# Patient Record
Sex: MALE | Race: WHITE
[De-identification: names, ages, dates, MRNs, and addresses within clinical notes are randomized per-mention and may not be internally consistent; named-entity substitution may affect disease eponyms.]

---

## 2018-04-09 ENCOUNTER — HOSPITAL ENCOUNTER (OUTPATIENT)
Dept: HOSPITAL 89 - CT | Age: 18
End: 2018-04-09
Attending: NURSE PRACTITIONER
Payer: COMMERCIAL

## 2018-04-09 ENCOUNTER — HOSPITAL ENCOUNTER (OUTPATIENT)
Dept: HOSPITAL 89 - ZZGRANDUC | Age: 18
End: 2018-04-09
Attending: NURSE PRACTITIONER
Payer: COMMERCIAL

## 2018-04-09 DIAGNOSIS — N20.1: ICD-10-CM

## 2018-04-09 DIAGNOSIS — M47.896: ICD-10-CM

## 2018-04-09 DIAGNOSIS — R10.9: Primary | ICD-10-CM

## 2018-04-09 DIAGNOSIS — N13.30: Primary | ICD-10-CM

## 2018-04-09 DIAGNOSIS — K42.9: ICD-10-CM

## 2018-04-09 LAB — PLATELET COUNT, AUTOMATED: 364 K/UL (ref 150–450)

## 2018-04-09 PROCEDURE — 84132 ASSAY OF SERUM POTASSIUM: CPT

## 2018-04-09 PROCEDURE — 82310 ASSAY OF CALCIUM: CPT

## 2018-04-09 PROCEDURE — 84520 ASSAY OF UREA NITROGEN: CPT

## 2018-04-09 PROCEDURE — 82040 ASSAY OF SERUM ALBUMIN: CPT

## 2018-04-09 PROCEDURE — 82247 BILIRUBIN TOTAL: CPT

## 2018-04-09 PROCEDURE — 84075 ASSAY ALKALINE PHOSPHATASE: CPT

## 2018-04-09 PROCEDURE — 82150 ASSAY OF AMYLASE: CPT

## 2018-04-09 PROCEDURE — 82947 ASSAY GLUCOSE BLOOD QUANT: CPT

## 2018-04-09 PROCEDURE — 84295 ASSAY OF SERUM SODIUM: CPT

## 2018-04-09 PROCEDURE — 83690 ASSAY OF LIPASE: CPT

## 2018-04-09 PROCEDURE — 84450 TRANSFERASE (AST) (SGOT): CPT

## 2018-04-09 PROCEDURE — 74176 CT ABD & PELVIS W/O CONTRAST: CPT

## 2018-04-09 PROCEDURE — 82435 ASSAY OF BLOOD CHLORIDE: CPT

## 2018-04-09 PROCEDURE — 82374 ASSAY BLOOD CARBON DIOXIDE: CPT

## 2018-04-09 PROCEDURE — 84460 ALANINE AMINO (ALT) (SGPT): CPT

## 2018-04-09 PROCEDURE — 85025 COMPLETE CBC W/AUTO DIFF WBC: CPT

## 2018-04-09 PROCEDURE — 84155 ASSAY OF PROTEIN SERUM: CPT

## 2018-04-09 PROCEDURE — 82565 ASSAY OF CREATININE: CPT

## 2018-04-09 NOTE — RADIOLOGY IMAGING REPORT
FACILITY: [facility] 

 

PATIENT NAME: Valentino Saba

: 2000

MR: [id number]

V: [id number]

EXAM DATE: 

ORDERING PHYSICIAN: DENISE PALOMO

TECHNOLOGIST: 

 

Location: [facility] [place]

Patient: Valentino Saba

: 2000

MRN: [id number]

Visit/Account:[id number]

Date of Sevice:  2018

 

ACCESSION #: [id number]

 

EXAMINATION:   Abdomen and pelvis CT without contrast

 

HISTORY:   Right flank pain, constipation

 

TECHNIQUE:   CT was performed through the abdomen and pelvis without iv contrast.  Sagittal and coron
al reformatted images were generated.

 

One of the following dose optimization techniques was utilized in the performance of this exam: autom
ated exposure control; adjustment of the mA and/or kV according to patient size; or use of iterative 
reconstruction technique.  Specific details can be referenced in the facility's radiology CT exam ope
rational policy.

 

COMPARISON:   None

 

FINDINGS:

 

Lower chest: Normal.

 

Spleen: Normal.

Adrenal glands: Normal.

Pancreas: Normal.

Kidneys: Mild to moderate right hydronephrosis. Mild right hydroureter. Calculus within the distal ri
ght ureter near the ureteropelvic vesicle junction measures 3.5 mm craniocaudad by 4 mm AP.

Liver / biliary: Normal gallbladder, normal liver.

 

Vessels: Normal.

 

Lymph node assessment: Normal.

 

Bowel including small bowel, colon and appendix: Normal stomach, normal small bowel. Appendix not vis
ualized. Moderate volume right hemicolonic stool.

 

Peritoneum / retroperitoneum / mesentery: Normal.

 

Pelvic  structures: Normal bladder, normal prostate and normal rectum. No pelvic fluid. Mild fat st
randing surrounds the distal right ureter. No pelvic fluid or adenopathy.

 

Body wall: Tiny fat-containing umbilical hernia.

 

Musculoskeletal: Chronic left L5 pars defect.

 

IMPRESSION:

 

1. 4 mm calculus within the distal right ureter near the ureterovesical junction with resultant mild 
right hydroureter and mild to moderate right hydronephrosis.

2. Chronic left L5 pars defect.

 

Report Dictated By: Cecilio Delgado MD at 2018 12:28 PM

 

Report E-Signed By: Cecilio Delgado MD  at 2018 12:35 PM

 

WSN:[id number]

## 2018-04-17 ENCOUNTER — HOSPITAL ENCOUNTER (INPATIENT)
Dept: HOSPITAL 89 - ER | Age: 18
LOS: 2 days | Discharge: HOME | DRG: 694 | End: 2018-04-19
Attending: UROLOGY | Admitting: UROLOGY
Payer: COMMERCIAL

## 2018-04-17 VITALS
BODY MASS INDEX: 29.86 KG/M2 | WEIGHT: 197 LBS | WEIGHT: 197 LBS | HEIGHT: 68 IN | HEIGHT: 68 IN | BODY MASS INDEX: 29.86 KG/M2

## 2018-04-17 VITALS — DIASTOLIC BLOOD PRESSURE: 80 MMHG | SYSTOLIC BLOOD PRESSURE: 121 MMHG

## 2018-04-17 DIAGNOSIS — N20.1: Primary | ICD-10-CM

## 2018-04-17 LAB — PLATELET COUNT, AUTOMATED: 362 K/UL (ref 150–450)

## 2018-04-17 PROCEDURE — 82435 ASSAY OF BLOOD CHLORIDE: CPT

## 2018-04-17 PROCEDURE — 99285 EMERGENCY DEPT VISIT HI MDM: CPT

## 2018-04-17 PROCEDURE — 84132 ASSAY OF SERUM POTASSIUM: CPT

## 2018-04-17 PROCEDURE — 82947 ASSAY GLUCOSE BLOOD QUANT: CPT

## 2018-04-17 PROCEDURE — 82310 ASSAY OF CALCIUM: CPT

## 2018-04-17 PROCEDURE — 87088 URINE BACTERIA CULTURE: CPT

## 2018-04-17 PROCEDURE — 74176 CT ABD & PELVIS W/O CONTRAST: CPT

## 2018-04-17 PROCEDURE — 83690 ASSAY OF LIPASE: CPT

## 2018-04-17 PROCEDURE — 84460 ALANINE AMINO (ALT) (SGPT): CPT

## 2018-04-17 PROCEDURE — 85025 COMPLETE CBC W/AUTO DIFF WBC: CPT

## 2018-04-17 PROCEDURE — 82565 ASSAY OF CREATININE: CPT

## 2018-04-17 PROCEDURE — 74420 UROGRAPHY RTRGR +-KUB: CPT

## 2018-04-17 PROCEDURE — 84295 ASSAY OF SERUM SODIUM: CPT

## 2018-04-17 PROCEDURE — 81001 URINALYSIS AUTO W/SCOPE: CPT

## 2018-04-17 PROCEDURE — 84155 ASSAY OF PROTEIN SERUM: CPT

## 2018-04-17 PROCEDURE — 82040 ASSAY OF SERUM ALBUMIN: CPT

## 2018-04-17 PROCEDURE — 82150 ASSAY OF AMYLASE: CPT

## 2018-04-17 PROCEDURE — 84075 ASSAY ALKALINE PHOSPHATASE: CPT

## 2018-04-17 PROCEDURE — 82374 ASSAY BLOOD CARBON DIOXIDE: CPT

## 2018-04-17 PROCEDURE — 84450 TRANSFERASE (AST) (SGOT): CPT

## 2018-04-17 PROCEDURE — 84520 ASSAY OF UREA NITROGEN: CPT

## 2018-04-17 PROCEDURE — 82247 BILIRUBIN TOTAL: CPT

## 2018-04-17 RX ADMIN — LIDOCAINE HYDROCHLORIDE SCH MLS/HR: 10 INJECTION, SOLUTION INFILTRATION; PERINEURAL at 22:31

## 2018-04-17 RX ADMIN — SODIUM CHLORIDE, SODIUM LACTATE, POTASSIUM CHLORIDE, AND CALCIUM CHLORIDE PRN MLS/HR: 600; 310; 30; 20 INJECTION, SOLUTION INTRAVENOUS at 22:31
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
## 2018-04-17 NOTE — RADIOLOGY IMAGING REPORT
FACILITY: [facility] 

 

PATIENT NAME: Valentino Saba

: 2000

MR: [id number]

V: [id number]

EXAM DATE: [id number]

ORDERING PHYSICIAN: WALTER CORDERO

TECHNOLOGIST: 

 

Location: [facility] - [place]

Patient: Valentino Saba

: 2000

MRN: [id number]

Visit/Account:[id number]

Date of Sevice:  2018

 

ACCESSION #: [id number]

 

CT abdomen and pelvis without contrast

 

Indication: Flank pain for one week.

 

Comparison: 2018.

 

Technique: Axial CT images are obtained through the abdomen and pelvis. Reformatted coronal and sagit
hernando images were reviewed. IV contrast was not administered.

 One of the following dose optimization techniques was utilized in the performance of this exam: auto
mated exposure control; adjustment of the mA and/or kV according to the patient's size; or use of an 
iterative reconstruction technique.  Specific details can be referenced in the facility's radiology C
T exam operational policy.

 

Findings:

Lower lung fields: Limited views lower lung field are unremarkable.

 

Evaluation of the solid organs of the abdomen is limited without IV contrast.

 

Liver: No focal parenchymal abnormality of the liver.

Biliary: Gallbladder appears unremarkable as well as the intra and extra hepatic biliary system.

Pancreas: Normal appearance.

Spleen: Normal appearance.

Adrenal glands: Unremarkable.

Kidneys / retroperitoneum: Mild right hydroureter and hydronephrosis secondary to a 5.6 mm stone at t
he UVJ. Similar to the previous exam although the stone appears slightly more distal. No other right 
urinary stones. Left kidney shows no stones or hydronephrosis. No discrete renal lesions.

 

 

Bowel / peritoneum / mesenteries: The visualized gastrointestinal tract is within normal limits. The 
appendix not definitely visualized. The stomach is distended with debris.

No free air, free fluid, fluid collections or areas of inflammation. Small umbilical hernia containin
g fat.

 

Lymph node assessment: No pathologic adenopathy identified.

 

Pelvic  structures: Appear unremarkable.

 

 

Vessels: No significant atherosclerotic calcifications seen throughout a nonaneurysmal abdominal aort
a and branches.

 

Musculoskeletal / Body wall: No acute or aggressive osseous abnormality. There is again a left-sided 
pars defect L5 level without spondylolisthesis.

 

 

 

IMPRESSION:

1. Continued right hydronephrosis due to a UVJ stone measuring 5.6 mm. The stone is similar to the pr
evious exam but may be slightly more distal.

2. Other stable chronic findings as above.

 

 

Report Dictated By: Harris Saavedra at 2018 8:11 PM

 

Report E-Signed By: Harris Saavedra  at 2018 8:17 PM

 

WSN:[id number]

## 2018-04-18 VITALS — SYSTOLIC BLOOD PRESSURE: 112 MMHG | DIASTOLIC BLOOD PRESSURE: 66 MMHG

## 2018-04-18 VITALS — SYSTOLIC BLOOD PRESSURE: 116 MMHG | DIASTOLIC BLOOD PRESSURE: 79 MMHG

## 2018-04-18 VITALS — SYSTOLIC BLOOD PRESSURE: 129 MMHG | DIASTOLIC BLOOD PRESSURE: 86 MMHG

## 2018-04-18 VITALS — SYSTOLIC BLOOD PRESSURE: 109 MMHG | DIASTOLIC BLOOD PRESSURE: 59 MMHG

## 2018-04-18 VITALS — DIASTOLIC BLOOD PRESSURE: 68 MMHG | SYSTOLIC BLOOD PRESSURE: 112 MMHG

## 2018-04-18 VITALS — SYSTOLIC BLOOD PRESSURE: 119 MMHG | DIASTOLIC BLOOD PRESSURE: 77 MMHG

## 2018-04-18 VITALS — SYSTOLIC BLOOD PRESSURE: 116 MMHG | DIASTOLIC BLOOD PRESSURE: 70 MMHG

## 2018-04-18 VITALS — DIASTOLIC BLOOD PRESSURE: 79 MMHG | SYSTOLIC BLOOD PRESSURE: 130 MMHG

## 2018-04-18 VITALS — SYSTOLIC BLOOD PRESSURE: 121 MMHG | DIASTOLIC BLOOD PRESSURE: 66 MMHG

## 2018-04-18 VITALS — DIASTOLIC BLOOD PRESSURE: 61 MMHG | SYSTOLIC BLOOD PRESSURE: 116 MMHG

## 2018-04-18 VITALS — SYSTOLIC BLOOD PRESSURE: 133 MMHG | DIASTOLIC BLOOD PRESSURE: 104 MMHG

## 2018-04-18 VITALS — SYSTOLIC BLOOD PRESSURE: 120 MMHG | DIASTOLIC BLOOD PRESSURE: 65 MMHG

## 2018-04-18 VITALS — SYSTOLIC BLOOD PRESSURE: 122 MMHG | DIASTOLIC BLOOD PRESSURE: 76 MMHG

## 2018-04-18 VITALS — SYSTOLIC BLOOD PRESSURE: 121 MMHG | DIASTOLIC BLOOD PRESSURE: 91 MMHG

## 2018-04-18 PROCEDURE — BT1DZZZ FLUOROSCOPY OF RIGHT KIDNEY, URETER AND BLADDER: ICD-10-PCS | Performed by: UROLOGY

## 2018-04-18 PROCEDURE — 0T768DZ DILATION OF RIGHT URETER WITH INTRALUMINAL DEVICE, VIA NATURAL OR ARTIFICIAL OPENING ENDOSCOPIC: ICD-10-PCS | Performed by: UROLOGY

## 2018-04-18 RX ADMIN — SODIUM CHLORIDE, SODIUM LACTATE, POTASSIUM CHLORIDE, AND CALCIUM CHLORIDE PRN MLS/HR: 600; 310; 30; 20 INJECTION, SOLUTION INTRAVENOUS at 06:04

## 2018-04-18 RX ADMIN — GENTAMICIN SULFATE SCH MLS/HR: 80 INJECTION, SOLUTION INTRAVENOUS at 22:07

## 2018-04-18 RX ADMIN — FAMOTIDINE SCH MG: 20 TABLET, FILM COATED ORAL at 21:17

## 2018-04-18 RX ADMIN — DOCUSATE SODIUM SCH MG: 100 CAPSULE, LIQUID FILLED ORAL at 21:18

## 2018-04-18 RX ADMIN — LIDOCAINE HYDROCHLORIDE SCH MLS/HR: 10 INJECTION, SOLUTION INFILTRATION; PERINEURAL at 21:17

## 2018-04-18 RX ADMIN — HYDROCORTISONE SCH GM: 1 OINTMENT TOPICAL at 21:28

## 2018-04-18 NOTE — RADIOLOGY IMAGING REPORT
FACILITY: [facility] [place] 

 

PATIENT NAME: Valentino Saba

: 2000

MR: [id number]

V: [id number]

EXAM DATE: [id number]

ORDERING PHYSICIAN: YULI ACUÑA

TECHNOLOGIST: 

 

Location: [facility] [place]

Patient: Valentino Saba

: 2000

MRN: [id number]

Visit/Account:[id number]

Date of Sevice:  2018

 

ACCESSION #: [id number]

 

Exam type: RETROGRADE PYELOGRAM

 

History: HEMATURIA

 

Comparison: CT 2018.

 

Findings:

 

Nine intraoperative spot films of abdomen pelvis were submitted.  The total fluoroscopy time was eigh
t seconds.  The total fluoroscopy dose was 4.54 mGray.  Images demonstrate a small amount of contrast
 in the right renal collecting system which is incompletely imaged and the right ureter.  There is al
so placement of a right ureteral stent.  A cystoscope projects over the lower pelvis

 

IMPRESSION:

 

1.  As above

 

Report Dictated By: Edda Kaufman MD at 2018 5:23 PM

 

Report E-Signed By: Edda Kaufman MD  at 2018 5:26 PM

 

WSN:[id number]

## 2018-04-18 NOTE — OPERATIVE REPORT 1
EVENT DATE: April 18, 2018

SURGEON:  Kameron Frye MD

ANESTHESIOLOGIST: Tuan Dhillon MD

ANESTHESIA: General



PREOPERATIVE DIAGNOSIS  

Right ureterolithiasis with associated colic, nausea and vomiting.  



POSTOPERATIVE DIAGNOSIS 

Right ureterolithiasis with associated colic, nausea and vomiting.  



PROCEDURE PERFORMED 

1.  Cystourethroscopy.

2.  Right ureteropyelogram.

3.  Right ureteral stent placement.  



DESCRIPTION OF PROCEDURE

Under general anesthetic, the patient was prepped and draped in the extended 
lithotomy position.  



The 21 panendoscope admitted through the urethra into the bladder.  



The urethra was normal.  



The prostate showed trilobar hyperplasia, moderate median lobe.



Bladder showed 2-3+ trabeculation.  



Trigone and ureteral orifices were normal, except on the right, where there was 
definite swelling and inflammation with probable stone just a few millimeters 
proximal to the right ureteral orifice. 



The right ureteral orifice was pinpoint in nature. 



Two pictures were obtained of the right ureteral orifice area.  



The ureteral pyelogram was obtained.



The stone was in the right distal ureter.  



The access catheter was placed and then the guide wire.  



The 6 Slov[place] x 26 cm Percuflex Plus was passed satisfactorily up the right 
collecting system without any difficulty.  



There was a grossly hydronephrotic drip following the stent placement with cecilia
, cloudy type urine emitting from the right ureteral orifice.  



Urine was obtained for urinalysis, culture and sensitivity at the beginning of 
the procedure.  



X-rays confirmed satisfactory positioning. 



Of note, the right distal ureteral orifice was almost completely to the midline 
at the immediate bladder neck area.  



In spite of the anatomy, the procedure was performed satisfactorily in a 
relatively atraumatic fashion in my opinion.  



Patient returned to the recovery room in satisfactory condition.



INDICATION FOR PROCEDURE

This is a 18-year-old white male complaining of right ureterorenal colic with 
associated nausea and vomiting when admitted through the emergency room this 
past evening.  



Historically the patient by history had a stone diagnosed on April 9, 2018.



By history, the patient thinks he has had the problem for approximately two 
months with intermittent right ureterorenal colic episodes.  



This was confirmed, he had a 5.6 mm and/or larger stone at the right distal 
ureter.  



Preoperatively the options were discussed with the patient.  He was agreeable 
to further evaluation and therapy.  



See operative note for details.  



DISCHARGE INSTRUCTIONS

Patient will be discharged home on Keflex, Pepcid, Motrin, Pyridium and Norco 
therapy.  Plan followup probably this coming Monday for ureteroscopy and 
treatment of the right distal ureteral stones, probable laser lithotripsy and 
extraction.  

MTDD

## 2018-04-19 VITALS — DIASTOLIC BLOOD PRESSURE: 62 MMHG | SYSTOLIC BLOOD PRESSURE: 113 MMHG

## 2018-04-19 VITALS — SYSTOLIC BLOOD PRESSURE: 116 MMHG | DIASTOLIC BLOOD PRESSURE: 66 MMHG

## 2018-04-19 VITALS — DIASTOLIC BLOOD PRESSURE: 50 MMHG | SYSTOLIC BLOOD PRESSURE: 123 MMHG

## 2018-04-19 RX ADMIN — HYDROCORTISONE SCH GM: 1 OINTMENT TOPICAL at 08:27

## 2018-04-19 RX ADMIN — DOCUSATE SODIUM SCH MG: 100 CAPSULE, LIQUID FILLED ORAL at 08:27

## 2018-04-19 RX ADMIN — FAMOTIDINE SCH MG: 20 TABLET, FILM COATED ORAL at 08:27

## 2018-04-19 RX ADMIN — GENTAMICIN SULFATE SCH MLS/HR: 80 INJECTION, SOLUTION INTRAVENOUS at 06:01

## 2018-04-23 ENCOUNTER — HOSPITAL ENCOUNTER (OUTPATIENT)
Dept: HOSPITAL 89 - OR | Age: 18
Discharge: HOME | End: 2018-04-23
Attending: UROLOGY
Payer: COMMERCIAL

## 2018-04-23 VITALS — SYSTOLIC BLOOD PRESSURE: 129 MMHG | DIASTOLIC BLOOD PRESSURE: 72 MMHG

## 2018-04-23 VITALS — SYSTOLIC BLOOD PRESSURE: 147 MMHG | DIASTOLIC BLOOD PRESSURE: 86 MMHG

## 2018-04-23 VITALS
BODY MASS INDEX: 29.4 KG/M2 | HEIGHT: 68 IN | WEIGHT: 194 LBS | HEIGHT: 68 IN | WEIGHT: 194 LBS | BODY MASS INDEX: 29.4 KG/M2

## 2018-04-23 VITALS — SYSTOLIC BLOOD PRESSURE: 132 MMHG | DIASTOLIC BLOOD PRESSURE: 72 MMHG

## 2018-04-23 VITALS — DIASTOLIC BLOOD PRESSURE: 80 MMHG | SYSTOLIC BLOOD PRESSURE: 118 MMHG

## 2018-04-23 DIAGNOSIS — N20.1: Primary | ICD-10-CM

## 2018-04-23 PROCEDURE — 74420 UROGRAPHY RTRGR +-KUB: CPT

## 2018-04-23 PROCEDURE — 88300 SURGICAL PATH GROSS: CPT

## 2018-04-23 PROCEDURE — 82365 CALCULUS SPECTROSCOPY: CPT

## 2018-04-23 PROCEDURE — 76100 X-RAY EXAM OF BODY SECTION: CPT

## 2018-04-23 PROCEDURE — 52353 CYSTOURETERO W/LITHOTRIPSY: CPT

## 2018-04-23 PROCEDURE — 74018 RADEX ABDOMEN 1 VIEW: CPT

## 2018-04-23 PROCEDURE — 52352 CYSTOURETERO W/STONE REMOVE: CPT

## 2018-04-23 NOTE — RADIOLOGY IMAGING REPORT
FACILITY: [facility] 

 

PATIENT NAME: Valentino Saba

: 2000

MR: [id number]

V: [id number]

EXAM DATE: 

ORDERING PHYSICIAN: YULI ACUÑA

TECHNOLOGIST: 

 

Location: [facility] - [place]

Patient: Valentino Saba

: 2000

MRN: [id number]

Visit/Account:[id number]

Date of Sevice:  2018

 

ACCESSION #: [id number]

 

Exam type: TOMOGRAPHY AND KUB W/CASSETTE

 

History: KIDNEY STONES

 

Comparison: CT abdomen and pelvis 2018.

 

Findings:

 

There is a right ureteral stent in place.  No definite calcifications are seen projecting over the re
nal shadows.  Previously noted distal right ureteral calculus is not definitively seen.  The bowel ga
s pattern is nonspecific

 

IMPRESSION:

 

1.  Right ureteral stent

 

No definite calcifications seen projecting over the renal shadows nor along the course of the stent

 

Report Dictated By: Edda Kaufman MD at 2018 11:27 AM

 

Report E-Signed By: Edda Kaufman MD  at 2018 11:48 AM

 

[id number]

## 2018-04-23 NOTE — RADIOLOGY IMAGING REPORT
FACILITY: [facility] - [place] 

 

PATIENT NAME: Valentino Saba

: 2000

MR: [id number]

V: [id number]

EXAM DATE: 

ORDERING PHYSICIAN: YULI ACUÑA

TECHNOLOGIST: 

 

Location: [facility] - [place]

Patient: Valentino Saba

: 2000

MRN: [id number]

Visit/Account:[id number]

Date of Sevice:  2018

 

ACCESSION #: [id number]

 

RETROGRADE PYELOGRAM

 

HISTORY:  HEMATURIA

 

COMPARISON:  Retrograde pyelogram from 

 

FINDINGS:

DOSE:  Air kerma  was  4     mGy.

Fluoroscopic images demonstrate ureteral stent.  A wire is noted.  Stent is removed.

 

IMPRESSION:

Procedural fluoroscopy

 

Report Dictated By: Brian Garcia MD at 2018 2:01 PM

 

Report E-Signed By: Brian Garcia MD  at 2018 2:02 PM

 

WSN:[id number]

## 2018-04-24 NOTE — OPERATIVE REPORT 1
EVENT DATE:  April 23, 2018

SURGEON:  Kameron Frye MD

ANESTHESIOLOGIST:  Miah Martinez MD

ANESTHESIA:  General anesthetic.





PREOPERATIVE DIAGNOSES  

1.  Right ureterolithiasis.

2.  Status post right ureteral stent placement.  



POSTOPERATIVE DIAGNOSES 

1.  Right ureterolithiasis.

2.  Status post right ureteral stent placement.



PROCEDURES PERFORMED 

1.  Cystourethroscopy.

2.  Right ureteral stent removal.

3.  Placement of right ureteral guidewire.

4.  Right ureterorenoscopy.

5.  Laser lithotripsy of right distal ureteral stone.  

6.  Basket extraction of multiple stones from the right ureter, approximately 
four to five stone fragments.  

7.  Right ureteral stent placement and subsequent removal.



DESCRIPTION OF PROCEDURE 

Under general anesthetic, the patient was prepped and draped in the extended 
lithotomy position.  



The 21 panendoscope was admitted through the urethra to the bladder.



The urethra was normal.  



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 
median lobe.  Verumontanum was normal.  



Bladder was entered.  



No stones were visible in the bladder.  



The right ureteral stent was visualized and removed.  



On repeat visualization of the bladder, no stones were visible in the bladder.  



The access catheter was placed in the distal right ureteral orifice, and a 
0.135 guidewire was passed up the right collecting system.



X-ray confirmed satisfactory positioning.  



On introduction of the semi-rigid ureterorenoscope, the scope was admitted to 
the distal right ureteral orifice, and almost immediately within 3 to 4 mm, the 
stone was visualized.  It was approximately 6 to 7 mm, yellowish-brownish type 
stone.  



Two attempts to extract the stone with the helical extractor were unsuccessful.
  



The stone was fragmented into approximately four to five particles.  



The stone fragments were extracted with the stone extractor.  



Right ureterorenoscopy showed no stones up the entire ureter with the guidewire 
in place.  The ureterorenoscope was in the right kidney.  Again, no stones were 
visualized in the right kidney.  



The ureterorenoscopy ascending and descending as previously noted showed no 
evidence of any residual ureterolithiasis.  



There was concern about the possibility of a fragment or two that might be up 
in the kidney.  



An external stent was placed.  



The stent was secured to a Webster.  



The patient was transferred to the stone treatment unit, and no stones could be 
visualized with or without contrast.  



The procedure was completed.  



The external stent and Webster were removed.  



The patient tolerated the procedure satisfactorily and returned to the recovery 
room in satisfactory condition.  



This is a 18-year-old white male complaining of right distal ureterolithiasis 
with associated ureterorenal colic.  



The patient states he has been having pain in the right flank area for 
approximately two months.



A right distal ureteral stone was diagnosed approximately one week ago, and the 
patient was to follow up for re-evaluation and therapy.  



That has been accomplished.  See operative note for details.  



Patient will be ready for discharge home when alert and functional.  Force 
fluids, 12 glasses of water per day.  Activities are as tolerated.  He is to 
strain all of his urine.  He is sent home with strainers.  He is to percuss his 
right kidney frequently to facilitate passage of stone particles.  Copy of 
instructions for renal percussion was given to the patient.  We plan followup 
in approximately two weeks.



The patient was given Toradol in the OR by Dr. Martinez.



Patient will be discharged home on Keflex, Pepcid, Motrin, Pyridium, and Norco 
therapy.  
MTDD